# Patient Record
Sex: FEMALE | URBAN - METROPOLITAN AREA
[De-identification: names, ages, dates, MRNs, and addresses within clinical notes are randomized per-mention and may not be internally consistent; named-entity substitution may affect disease eponyms.]

---

## 2021-09-29 ENCOUNTER — OFFICE VISIT (OUTPATIENT)
Dept: URBAN - METROPOLITAN AREA CLINIC 6 | Facility: CLINIC | Age: 41
End: 2021-09-29
Payer: MEDICAID

## 2021-09-29 DIAGNOSIS — H35.40 PERIPHERAL RETINAL DEGENERATION: ICD-10-CM

## 2021-09-29 DIAGNOSIS — H52.13 MYOPIA, BILATERAL: Primary | ICD-10-CM

## 2021-09-29 PROCEDURE — 92014 COMPRE OPH EXAM EST PT 1/>: CPT | Performed by: OPTOMETRIST

## 2021-09-29 ASSESSMENT — INTRAOCULAR PRESSURE
OD: 22
OS: 19

## 2021-09-29 ASSESSMENT — VISUAL ACUITY
OS: 20/25
OD: 20/25

## 2021-09-29 NOTE — IMPRESSION/PLAN
Impression: Peripheral retinal degeneration: H35.40. Plan: Peripheral retina degeneration OU - risk of retinal tears and detachment discussed. Retinal detachment symptoms discussed, and patient agreed to return immediately if new symptoms develop.

## 2021-09-29 NOTE — IMPRESSION/PLAN
Impression: Myopia, bilateral: H52.13. Plan: Blurry vision / refractive error: Prescription given for glasses.

## 2022-12-19 ENCOUNTER — OFFICE VISIT (OUTPATIENT)
Dept: URBAN - METROPOLITAN AREA CLINIC 5 | Facility: CLINIC | Age: 42
End: 2022-12-19
Payer: MEDICAID

## 2022-12-19 DIAGNOSIS — H35.40 PERIPHERAL RETINAL DEGENERATION: Primary | ICD-10-CM

## 2022-12-19 DIAGNOSIS — H43.393 OTHER VITREOUS OPACITIES, BILATERAL: ICD-10-CM

## 2022-12-19 DIAGNOSIS — H16.223 KERATOCONJUNCTIVITIS SICCA, BILATERAL: ICD-10-CM

## 2022-12-19 PROCEDURE — 92014 COMPRE OPH EXAM EST PT 1/>: CPT | Performed by: OPTOMETRIST

## 2022-12-19 ASSESSMENT — INTRAOCULAR PRESSURE
OD: 23
OS: 23

## 2022-12-19 NOTE — IMPRESSION/PLAN
Impression: Keratoconjunctivitis sicca, bilateral: R89.233. Plan: Mild dry eyes  - Recommend use of high quality artificial tears 3-4x per day prn.